# Patient Record
Sex: FEMALE | Race: WHITE | NOT HISPANIC OR LATINO | Employment: FULL TIME | URBAN - METROPOLITAN AREA
[De-identification: names, ages, dates, MRNs, and addresses within clinical notes are randomized per-mention and may not be internally consistent; named-entity substitution may affect disease eponyms.]

---

## 2023-11-09 ENCOUNTER — OFFICE VISIT (OUTPATIENT)
Dept: FAMILY MEDICINE CLINIC | Facility: CLINIC | Age: 48
End: 2023-11-09
Payer: COMMERCIAL

## 2023-11-09 VITALS
WEIGHT: 153.6 LBS | SYSTOLIC BLOOD PRESSURE: 120 MMHG | TEMPERATURE: 97.9 F | BODY MASS INDEX: 22.75 KG/M2 | DIASTOLIC BLOOD PRESSURE: 80 MMHG | HEART RATE: 88 BPM | HEIGHT: 69 IN | OXYGEN SATURATION: 99 %

## 2023-11-09 DIAGNOSIS — Z23 ENCOUNTER FOR IMMUNIZATION: ICD-10-CM

## 2023-11-09 DIAGNOSIS — R07.9 CHEST PAIN, UNSPECIFIED TYPE: ICD-10-CM

## 2023-11-09 DIAGNOSIS — Z00.00 ANNUAL PHYSICAL EXAM: Primary | ICD-10-CM

## 2023-11-09 DIAGNOSIS — N95.1 PERIMENOPAUSE: ICD-10-CM

## 2023-11-09 PROBLEM — K64.9 HEMORRHOIDS: Status: ACTIVE | Noted: 2020-09-16

## 2023-11-09 PROBLEM — F41.1 GAD (GENERALIZED ANXIETY DISORDER): Status: ACTIVE | Noted: 2021-11-23

## 2023-11-09 PROBLEM — D70.9 NEUTROPENIA (HCC): Status: ACTIVE | Noted: 2021-07-15

## 2023-11-09 PROBLEM — N83.209 HEMORRHAGIC OVARIAN CYST: Status: ACTIVE | Noted: 2021-11-05

## 2023-11-09 PROBLEM — R92.30 DENSE BREAST TISSUE ON MAMMOGRAM: Status: ACTIVE | Noted: 2021-08-30

## 2023-11-09 PROCEDURE — 90471 IMMUNIZATION ADMIN: CPT | Performed by: FAMILY MEDICINE

## 2023-11-09 PROCEDURE — 99386 PREV VISIT NEW AGE 40-64: CPT | Performed by: FAMILY MEDICINE

## 2023-11-09 PROCEDURE — 93000 ELECTROCARDIOGRAM COMPLETE: CPT | Performed by: FAMILY MEDICINE

## 2023-11-09 PROCEDURE — 90686 IIV4 VACC NO PRSV 0.5 ML IM: CPT | Performed by: FAMILY MEDICINE

## 2023-11-09 RX ORDER — OMEGA-3S/DHA/EPA/FISH OIL 300-1000MG
300 CAPSULE ORAL DAILY
COMMUNITY
End: 2023-11-09

## 2023-11-09 RX ORDER — FAMOTIDINE 20 MG
TABLET ORAL
COMMUNITY
End: 2023-11-09

## 2023-11-09 NOTE — PATIENT INSTRUCTIONS
Promotion of Mental Health  Dr. Deven Roper recommends the following for the promotion of mental health:    Counseling/therapy may be of help to you  [facility]   If you are taking medication, you must take it as prescribed. Do not stop taking it or change doses without speaking to your doctor. I encourage daily mindfulness habits including:  Go outside  Breathing exercises daily  Meditations or guided relaxation  Sleep hygiene (going to bed and waking up at the same time every day, even weekends)  Fueling your body and mind with water and nutritious food throughout the day  Let your friends and family know how you're feeling. Give them the opportunity to support you. Do not isolate yourself. Similarly, you may want to spend less time with people in your life who have bad habits you are trying to eliminate, or those who bring you down. Be mindful of habits like smoking, drinking alcohol, and the use of other substances. I recommend a "clean" lifestyle to improve your mental health. Use smart phone apps and YouFoodyube to find meditations and breathing exercises:  Free apps I like: Insight Timer, Woebot, Breethe, Breathe+, MyLife Meditation --> note some of these apps have free and paid sections, so you may not be able to access all features. Paid apps I like: Headspace, Breathing Zone, [place], Calm  I am not sponsored by nor do I receive money from these apps, and they are not officially recommended by Bonilla Villeda. I recommend them because I use them or my patients use them with success.    If you ever feel like you may hurt yourself or someone else, please call 9-1-1, text [phone], or go to the nearest emergency department    I also recommend signing up for My Chart if you haven't already, which is the [facility]. Adria's jenny, so that you can send me messages to ask questions through the My Chart portal.     [email or web address]    National Suicide Prevention Hotline: 3-653.423.2767  If you or someone you know is suicidal or in emotional distress, contact the [place]. Trained crisis workers are available to talk 24 hours a day, 7 days a week. Your confidential and toll-free call goes to the nearest crisis center in the "[employer] These centers provide crisis counseling and mental health referrals. If you or someone you know is in immediate danger, please call 9-1-1. [facility] Treatment Referral Helpline: 6-426.822.9697  Get general information on mental health and locate treatment services in your area. Speak to a live person, Monday through Friday from 8 a.m. to 8 p.m. EST. Support Groups:  [facility] Support Groups  Call intake to register: [address] on Mental Illness:  [address] [place] [address]  [phone]  http://www.OnForce/  They provide multiple services including support groups for those with mental illness, as well as the family members of individuals with mental illness      Wellness Visit for Adults   AMBULATORY CARE:   A wellness visit  is when you see your healthcare provider to get screened for health problems. Your healthcare provider will also give you advice on how to stay healthy. Write down your questions so you remember to ask them. Ask your healthcare provider how often you should have a wellness visit. What happens at a wellness visit:  Your healthcare provider will ask about your health, and your family history of health problems. This includes high blood pressure, heart disease, and cancer. He or she will ask if you have symptoms that concern you, if you smoke, and about your mood. You may also be asked about your intake of medicines, supplements, food, and alcohol. Any of the following may be done: Your weight  will be checked. Your height may also be checked so your body mass index (BMI) can be calculated.  Your BMI shows if you are at a healthy weight. Your blood pressure  and heart rate will be checked. Your temperature may also be checked. Blood and urine tests  may be done. Blood tests may be done to check your cholesterol levels. Abnormal cholesterol levels increase your risk for heart disease and stroke. You may also need a blood or urine test to check for diabetes if you are at increased risk. Urine tests may be done to look for signs of an infection or kidney disease. A physical exam  includes checking your heartbeat and lungs with a stethoscope. Your healthcare provider may also check your skin to look for sun damage. Screening tests  may be recommended. A screening test is done to check for diseases that may not cause symptoms. The screening tests you may need depend on your age, gender, family history, and lifestyle habits. For example, colorectal screening may be recommended if you are 48years old or older. Screening tests you need if you are a woman:   A Pap smear  is used to screen for cervical cancer. Pap smears are usually done every 3 to 5 years depending on your age. You may need them more often if you have had abnormal Pap smear test results in the past. Ask your healthcare provider how often you should have a Pap smear. A mammogram  is an x-ray of your breasts to screen for breast cancer. Experts recommend mammograms every 2 years starting at age 48 years. You may need a mammogram at age 52 years or younger if you have an increased risk for breast cancer. Talk to your healthcare provider about when you should start having mammograms and how often you need them. Vaccines you may need:   Get an influenza vaccine  every year. The influenza vaccine protects you from the flu. Several types of viruses cause the flu. The viruses change over time, so new vaccines are made each year. Get a tetanus-diphtheria (Td) booster vaccine  every 10 years.  This vaccine protects you against tetanus and diphtheria. Tetanus is a severe infection that may cause painful muscle spasms and lockjaw. Diphtheria is a severe bacterial infection that causes a thick covering in the back of your mouth and throat. Get a human papillomavirus (HPV) vaccine  if you are female and aged 23 to 32 or male 23 to 24 and never received it. This vaccine protects you from HPV infection. HPV is the most common infection spread by sexual contact. HPV may also cause vaginal, penile, and anal cancers. Get a pneumococcal vaccine  if you are aged 72 years or older. The pneumococcal vaccine is an injection given to protect you from pneumococcal disease. Pneumococcal disease is an infection caused by pneumococcal bacteria. The infection may cause pneumonia, meningitis, or an ear infection. Get a shingles vaccine  if you are 60 or older, even if you have had shingles before. The shingles vaccine is an injection to protect you from the varicella-zoster virus. This is the same virus that causes chickenpox. Shingles is a painful rash that develops in people who had chickenpox or have been exposed to the virus. How to eat healthy:  My Plate is a model for planning healthy meals. It shows the types and amounts of foods that should go on your plate. Fruits and vegetables make up about half of your plate, and grains and protein make up the other half. A serving of dairy is included on the side of your plate. The amount of calories and serving sizes you need depends on your age, gender, weight, and height. Examples of healthy foods are listed below:  Eat a variety of vegetables  such as dark green, red, and orange vegetables. You can also include canned vegetables low in sodium (salt) and frozen vegetables without added butter or sauces. Eat a variety of fresh fruits , canned fruit in 100% juice, frozen fruit, and dried fruit. Include whole grains. At least half of the grains you eat should be whole grains.  Examples include whole-wheat bread, wheat pasta, brown rice, and whole-grain cereals such as oatmeal.    Eat a variety of protein foods such as seafood (fish and shellfish), lean meat, and poultry without skin (turkey and chicken). Examples of lean meats include pork leg, shoulder, or tenderloin, and beef round, sirloin, tenderloin, and extra lean ground beef. Other protein foods include eggs and egg substitutes, beans, peas, soy products, nuts, and seeds. Choose low-fat dairy products such as skim or 1% milk or low-fat yogurt, cheese, and cottage cheese. Limit unhealthy fats  such as butter, hard margarine, and shortening. Exercise:  Exercise at least 30 minutes per day on most days of the week. Some examples of exercise include walking, biking, dancing, and swimming. You can also fit in more physical activity by taking the stairs instead of the elevator or parking farther away from stores. Include muscle strengthening activities 2 days each week. Regular exercise provides many health benefits. It helps you manage your weight, and decreases your risk for type 2 diabetes, heart disease, stroke, and high blood pressure. Exercise can also help improve your mood. Ask your healthcare provider about the best exercise plan for you. General health and safety guidelines:   Do not smoke. Nicotine and other chemicals in cigarettes and cigars can cause lung damage. Ask your healthcare provider for information if you currently smoke and need help to quit. E-cigarettes or smokeless tobacco still contain nicotine. Talk to your healthcare provider before you use these products. Limit alcohol. A drink of alcohol is 12 ounces of beer, 5 ounces of wine, or 1½ ounces of liquor. Lose weight, if needed. Being overweight increases your risk of certain health conditions. These include heart disease, high blood pressure, type 2 diabetes, and certain types of cancer. Protect your skin. Do not sunbathe or use tanning beds.  Use sunscreen with a SPF 13 or higher. Apply sunscreen at least 15 minutes before you go outside. Reapply sunscreen every 2 hours. Wear protective clothing, hats, and sunglasses when you are outside. Drive safely. Always wear your seatbelt. Make sure everyone in your car wears a seatbelt. A seatbelt can save your life if you are in an accident. Do not use your cell phone when you are driving. This could distract you and cause an accident. Pull over if you need to make a call or send a text message. Practice safe sex. Use latex condoms if are sexually active and have more than one partner. Your healthcare provider may recommend screening tests for sexually transmitted infections (STIs). Wear helmets, lifejackets, and protective gear. Always wear a helmet when you ride a bike or motorcycle, go skiing, or play sports that could cause a head injury. Wear protective equipment when you play sports. Wear a lifejacket when you are on a boat or doing water sports. © Copyright Christine Heather 2023 Information is for End User's use only and may not be sold, redistributed or otherwise used for commercial purposes. The above information is an  only. It is not intended as medical advice for individual conditions or treatments. Talk to your doctor, nurse or pharmacist before following any medical regimen to see if it is safe and effective for you.

## 2023-11-10 ENCOUNTER — TELEPHONE (OUTPATIENT)
Dept: ADMINISTRATIVE | Facility: OTHER | Age: 48
End: 2023-11-10

## 2023-11-10 NOTE — TELEPHONE ENCOUNTER
Upon review of the In Basket request we were able to locate, review, and update the patient chart as requested for Pap Smear (HPV) aka Cervical Cancer Screening. Any additional questions or concerns should be emailed to the Practice Liaisons via the appropriate education email address, please do not reply via In Basket.     Thank you  Violeta Branch

## 2023-11-10 NOTE — TELEPHONE ENCOUNTER
----- Message from Brook Forbes MD sent at 11/9/2023 12:31 PM EST -----  Regarding: Health Maintenance  11/09/23 12:31 PM    Az, our patient Gretchen Orr has had Pap Smear (HPV) aka Cervical Cancer Screening completed/performed. Please assist in updating the patient chart by pulling the Care Everywhere (CE) document. The date of service is 2019.      Thank you,  Brook Forbes MD  [facility]

## 2023-11-13 ENCOUNTER — OFFICE VISIT (OUTPATIENT)
Dept: OBGYN CLINIC | Facility: CLINIC | Age: 48
End: 2023-11-13

## 2023-11-13 VITALS
HEART RATE: 109 BPM | WEIGHT: 154.6 LBS | HEIGHT: 69 IN | SYSTOLIC BLOOD PRESSURE: 130 MMHG | DIASTOLIC BLOOD PRESSURE: 70 MMHG | BODY MASS INDEX: 22.9 KG/M2

## 2023-11-13 DIAGNOSIS — Z12.4 SCREENING FOR CERVICAL CANCER: ICD-10-CM

## 2023-11-13 DIAGNOSIS — R22.32 AXILLARY MASS, LEFT: ICD-10-CM

## 2023-11-13 DIAGNOSIS — Z01.419 ENCOUNTER FOR ANNUAL ROUTINE GYNECOLOGICAL EXAMINATION: Primary | ICD-10-CM

## 2023-11-13 PROBLEM — N83.209 HEMORRHAGIC OVARIAN CYST: Status: RESOLVED | Noted: 2021-11-05 | Resolved: 2023-11-13

## 2023-11-13 PROCEDURE — G0476 HPV COMBO ASSAY CA SCREEN: HCPCS | Performed by: OBSTETRICS & GYNECOLOGY

## 2023-11-13 PROCEDURE — G0145 SCR C/V CYTO,THINLAYER,RESCR: HCPCS | Performed by: OBSTETRICS & GYNECOLOGY

## 2023-11-13 RX ORDER — VITAMIN B COMPLEX
TABLET ORAL
COMMUNITY
Start: 2021-07-10

## 2023-11-13 NOTE — PROGRESS NOTES
Negrito Martinez is a 57 y.o. female who presents for annual exam.      Chief Complaint   Patient presents with    New Patient Visit     Yearly and read u/s breast report completed and "herscan"     New patient - Previously at [facility]  She had a HerScan breast US completed recently - noted "left axillary lymph node with thickened cortex". Had been recommended for ABUS for supplemental screening in the past due to dense breast tissue but due to cost she pursued HerScan as it would be less out of pocket    Intermittent bilateral breast pain/discomfort in past several months   Has not noticed any lump. She will intermittently get some pain in pec muscles with exertion (gardening etc)   Has not noticed any breast/axillary mass     Last over 100 lbs in last few years   still regular menstrual cycles - overall lighter than previously, lasting 4-5 days       Last Pap: 2023 pap NILM/HPV neg   Last mammogram: 2023 BIRADS1, extremely dense  Colorectal cancer screening: Not on file no prior   DEXA: n/a    Current contraception: condoms  History of abnormal Pap smear: no  History of abnormal mammogram: yes - left breast biopsy  - benign       Family history of uterine or ovarian cancer: no  Family history of breast cancer: yes - MGM age 54  Family history of colon cancer: no      Menstrual History:  OB History          1    Para   1    Term   1            AB        Living   1         SAB        IAB        Ectopic        Multiple        Live Births   1                    Patient's last menstrual period was 10/31/2023 (approximate). No past medical history on file.   Past Surgical History:   Procedure Laterality Date     SECTION      MAMMO STEREOTACTIC BREAST BIOPSY LEFT (ALL INC)       Family History   Problem Relation Age of Onset    Melanoma Mother     Hyperlipidemia Father     Heart attack Father     Breast cancer Maternal Grandmother 46       Social History Tobacco Use    Smoking status: Never     Passive exposure: Past    Smokeless tobacco: Never   Vaping Use    Vaping Use: Never used   Substance Use Topics    Alcohol use: Yes    Drug use: Never          Current Outpatient Medications:     cholecalciferol (VITAMIN D3) 25 mcg (1,000 units) tablet, , Disp: , Rfl:     Calcium Carbonate 1500 (600 Ca) MG TABS, , Disp: , Rfl:     Cholecalciferol 25 MCG (1000 UT) CHEW, Chew, Disp: , Rfl:     Allergies   Allergen Reactions    Azithromycin Hives     Other reaction(s): hives           Review of Systems   Constitutional:  Negative for appetite change, chills and fever. Eyes:  Negative for visual disturbance. Respiratory:  Negative for cough, chest tightness and shortness of breath. Cardiovascular:  Negative for chest pain. Gastrointestinal:  Negative for abdominal distention, abdominal pain, constipation, diarrhea, nausea and vomiting. Endocrine: Negative for cold intolerance and heat intolerance. Genitourinary:  Negative for difficulty urinating, dyspareunia, dysuria, frequency, genital sores, pelvic pain, urgency, vaginal bleeding, vaginal discharge and vaginal pain. Musculoskeletal:  Negative for arthralgias. Neurological:  Negative for light-headedness and headaches. Hematological:  Does not bruise/bleed easily. Psychiatric/Behavioral:  Negative for behavioral problems. The patient is nervous/anxious. All other systems reviewed and are negative. /70 (BP Location: Right arm, Patient Position: Sitting, Cuff Size: Adult)   Pulse (!) 109   Ht 5' 9" (1.753 m)   Wt 70.1 kg (154 lb 9.6 oz)   LMP 10/31/2023 (Approximate)   BMI 22.83 kg/m²         Physical Exam  Constitutional:       General: She is not in acute distress. Appearance: Normal appearance. Genitourinary:      Vulva, bladder and urethral meatus normal.      No lesions in the vagina. Right Labia: No rash, tenderness, lesions or skin changes.      Left Labia: No tenderness, lesions, skin changes or rash. No labial fusion noted. No inguinal adenopathy present in the right or left side. No vaginal discharge, erythema, tenderness, bleeding or ulceration. No vaginal prolapse present. Right Adnexa: not tender, not full and no mass present. Left Adnexa: not tender, not full and no mass present. No cervical motion tenderness, discharge, friability, lesion or polyp. Uterus is not enlarged, fixed, tender or irregular. No uterine mass detected. Uterus is anteverted. Pelvic exam was performed with patient in the lithotomy position. Breasts:     Right: No swelling, bleeding, inverted nipple, mass, nipple discharge, skin change or tenderness. Left: No swelling, bleeding, inverted nipple, mass, nipple discharge, skin change or tenderness. HENT:      Head: Normocephalic and atraumatic. Neck:      Thyroid: No thyromegaly. Cardiovascular:      Rate and Rhythm: Normal rate and regular rhythm. Pulmonary:      Effort: Pulmonary effort is normal. No accessory muscle usage or respiratory distress. Abdominal:      General: There is no distension. Palpations: Abdomen is soft. Tenderness: There is no abdominal tenderness. There is no guarding or rebound. Musculoskeletal:         General: Normal range of motion. Cervical back: Normal range of motion and neck supple. Lymphadenopathy:      Upper Body:      Right upper body: No supraclavicular or axillary adenopathy. Left upper body: No supraclavicular or axillary adenopathy. Lower Body: No right inguinal and no right inguinal adenopathy. No left inguinal and no left inguinal adenopathy. Neurological:      General: No focal deficit present. Mental Status: She is alert. Skin:     General: Skin is warm and dry. Findings: No erythema.    Psychiatric:         Mood and Affect: Mood normal.         Behavior: Behavior normal.   Vitals and nursing note reviewed. Exam conducted with a chaperone present. No results found for this or any previous visit. Axillary mass, left  No mass palpable on exam. Given US findings a diagnostic mammogram/US was ordered     Encounter for annual routine gynecological examination  - Discussed ACOG guidelines for pap smear screening frequency: performed today  - Discussed healthy lifestyle recommendations for diet, exercise and self breast awareness.  - Discussed ACOG recommendations for screening mammograms: as above   - Discussed age based recommendations for adequate calcium and vitamin D intake. No additional osteoporosis screening indicated at this time. - Discussed ACOG recommendations for colon cancer screening: reviewed options, she declines at this time   - Safe sex practices were discussed and STI testing was not desired by the patient  - Contraceptive options were reviewed: happy with condoms   - Routine follow up in 1 year was recommended or sooner as needed. All questions and concerns were addressed.

## 2023-11-13 NOTE — ASSESSMENT & PLAN NOTE
- Discussed ACOG guidelines for pap smear screening frequency: performed today  - Discussed healthy lifestyle recommendations for diet, exercise and self breast awareness.  - Discussed ACOG recommendations for screening mammograms: as above   - Discussed age based recommendations for adequate calcium and vitamin D intake. No additional osteoporosis screening indicated at this time. - Discussed ACOG recommendations for colon cancer screening: reviewed options, she declines at this time   - Safe sex practices were discussed and STI testing was not desired by the patient  - Contraceptive options were reviewed: happy with condoms   - Routine follow up in 1 year was recommended or sooner as needed. All questions and concerns were addressed.

## 2023-11-15 LAB
HPV HR 12 DNA CVX QL NAA+PROBE: NEGATIVE
HPV16 DNA CVX QL NAA+PROBE: NEGATIVE
HPV18 DNA CVX QL NAA+PROBE: NEGATIVE

## 2023-11-21 LAB
LAB AP GYN PRIMARY INTERPRETATION: NORMAL
Lab: NORMAL

## 2023-12-14 ENCOUNTER — APPOINTMENT (OUTPATIENT)
Dept: LAB | Facility: CLINIC | Age: 48
End: 2023-12-14
Payer: COMMERCIAL

## 2023-12-14 DIAGNOSIS — R07.9 CHEST PAIN, UNSPECIFIED TYPE: ICD-10-CM

## 2023-12-14 DIAGNOSIS — N95.1 PERIMENOPAUSE: ICD-10-CM

## 2023-12-14 LAB
25(OH)D3 SERPL-MCNC: 40.5 NG/ML (ref 30–100)
ALBUMIN SERPL BCP-MCNC: 4.3 G/DL (ref 3.5–5)
ALP SERPL-CCNC: 45 U/L (ref 34–104)
ALT SERPL W P-5'-P-CCNC: 13 U/L (ref 7–52)
ANION GAP SERPL CALCULATED.3IONS-SCNC: 9 MMOL/L
AST SERPL W P-5'-P-CCNC: 20 U/L (ref 13–39)
BILIRUB SERPL-MCNC: 0.64 MG/DL (ref 0.2–1)
BUN SERPL-MCNC: 13 MG/DL (ref 5–25)
CALCIUM SERPL-MCNC: 9.3 MG/DL (ref 8.4–10.2)
CHLORIDE SERPL-SCNC: 104 MMOL/L (ref 96–108)
CHOLEST SERPL-MCNC: 171 MG/DL
CK SERPL-CCNC: 60 U/L (ref 26–192)
CO2 SERPL-SCNC: 27 MMOL/L (ref 21–32)
CREAT SERPL-MCNC: 0.67 MG/DL (ref 0.6–1.3)
ERYTHROCYTE [DISTWIDTH] IN BLOOD BY AUTOMATED COUNT: 11.9 % (ref 11.6–15.1)
GFR SERPL CREATININE-BSD FRML MDRD: 105 ML/MIN/1.73SQ M
GLUCOSE P FAST SERPL-MCNC: 86 MG/DL (ref 65–99)
HCT VFR BLD AUTO: 41.6 % (ref 34.8–46.1)
HDLC SERPL-MCNC: 49 MG/DL
HGB BLD-MCNC: 13.4 G/DL (ref 11.5–15.4)
LDLC SERPL CALC-MCNC: 111 MG/DL (ref 0–100)
MCH RBC QN AUTO: 29.7 PG (ref 26.8–34.3)
MCHC RBC AUTO-ENTMCNC: 32.2 G/DL (ref 31.4–37.4)
MCV RBC AUTO: 92 FL (ref 82–98)
PLATELET # BLD AUTO: 201 THOUSANDS/UL (ref 149–390)
PMV BLD AUTO: 10.3 FL (ref 8.9–12.7)
POTASSIUM SERPL-SCNC: 3.8 MMOL/L (ref 3.5–5.3)
PROT SERPL-MCNC: 7.4 G/DL (ref 6.4–8.4)
RBC # BLD AUTO: 4.51 MILLION/UL (ref 3.81–5.12)
SODIUM SERPL-SCNC: 140 MMOL/L (ref 135–147)
TRIGL SERPL-MCNC: 55 MG/DL
TSH SERPL DL<=0.05 MIU/L-ACNC: 1.35 UIU/ML (ref 0.45–4.5)
WBC # BLD AUTO: 2.94 THOUSAND/UL (ref 4.31–10.16)

## 2023-12-14 PROCEDURE — 85027 COMPLETE CBC AUTOMATED: CPT

## 2023-12-14 PROCEDURE — 80061 LIPID PANEL: CPT

## 2023-12-14 PROCEDURE — 84443 ASSAY THYROID STIM HORMONE: CPT

## 2023-12-14 PROCEDURE — 82306 VITAMIN D 25 HYDROXY: CPT

## 2023-12-14 PROCEDURE — 82550 ASSAY OF CK (CPK): CPT

## 2023-12-14 PROCEDURE — 80053 COMPREHEN METABOLIC PANEL: CPT

## 2023-12-14 PROCEDURE — 36415 COLL VENOUS BLD VENIPUNCTURE: CPT

## 2024-01-12 ENCOUNTER — OFFICE VISIT (OUTPATIENT)
Age: 49
End: 2024-01-12
Payer: COMMERCIAL

## 2024-01-12 ENCOUNTER — TELEPHONE (OUTPATIENT)
Dept: OBGYN CLINIC | Facility: CLINIC | Age: 49
End: 2024-01-12

## 2024-01-12 ENCOUNTER — PATIENT MESSAGE (OUTPATIENT)
Dept: OBGYN CLINIC | Facility: CLINIC | Age: 49
End: 2024-01-12

## 2024-01-12 VITALS
DIASTOLIC BLOOD PRESSURE: 72 MMHG | HEIGHT: 69 IN | OXYGEN SATURATION: 99 % | BODY MASS INDEX: 22.96 KG/M2 | HEART RATE: 100 BPM | SYSTOLIC BLOOD PRESSURE: 118 MMHG | WEIGHT: 155 LBS

## 2024-01-12 DIAGNOSIS — N63.10 MASS OF RIGHT BREAST, UNSPECIFIED QUADRANT: Primary | ICD-10-CM

## 2024-01-12 DIAGNOSIS — N64.4 PAIN OF BOTH BREASTS: Primary | ICD-10-CM

## 2024-01-12 PROBLEM — Z01.419 ENCOUNTER FOR ANNUAL ROUTINE GYNECOLOGICAL EXAMINATION: Status: RESOLVED | Noted: 2023-11-13 | Resolved: 2024-01-12

## 2024-01-12 PROCEDURE — 99213 OFFICE O/P EST LOW 20 MIN: CPT | Performed by: PHYSICIAN ASSISTANT

## 2024-01-12 NOTE — PROGRESS NOTES
Patient c/o pain in right  breast X 1 month.  There is + pain, no breast discharge, no discoloration.  There was no breast injury.  Patient has breast biopsy left breast 2 years ago.    Painful lump right breast 10/2023  Right armpit swollen and tender end of December.  Went away after 5 days.    ABUS @ Her[facility] done 10/2023;  left axillary lymph node with thickened cortex  (patient was able to pull this up on her phone)    Family history of breast cancer: MGM @ 50's    Menses monthly and regular, duration 5-6 days    Last mammogram: 3/6/23     Current Outpatient Medications on File Prior to Visit   Medication Sig Dispense Refill    Calcium Carbonate 1500 (600 Ca) MG TABS  (Patient not taking: Reported on 11/9/2023)      cholecalciferol (VITAMIN D3) 25 mcg (1,000 units) tablet       Cholecalciferol 25 MCG (1000 UT) CHEW Chew       No current facility-administered medications on file prior to visit.       Allergies   Allergen Reactions    Azithromycin Hives     Other reaction(s): hives           Vitals:    01/12/24 1315   BP: 118/72   Pulse: 100   SpO2: 99%         breasts examined sitting and lying  Left breast- thickened area at 12 oclock 3 cm from nipple. no discharge, no discoloration, no tenderness, no retraction, no dimpling  Right breast- thickened area 12 oclock 3 cm from nipple, no discharge, no discoloration, no tenderness, no retraction, no dimpling    Area of thickening feels similar in both breasts    Nodes- no cervical, subclavicular, or axillary node enlargment      Assessment:  Thickening both breast    Plan:  bilateral diagnostic mammogram order has already been placed  US right  breast/  US left breast- order already placed  Await results of imaging  f/u pending results   Oriented - self; Oriented - place; Oriented - time

## 2024-01-12 NOTE — PATIENT COMMUNICATION
Patient was requesting for the right side as well due to new s/s of tightness on the outside of the right breast as well as pain that is on and off. She also states of a sensation, tenderness and becomes swollen around her armpit as well.  Patient did have an outside scan/u/s of breast and they saw something with the lymph node is why she is completing the diagnostic in the first place but would like to do both at this time due to her new onset symptoms appearing on the right breast. Please advise

## 2024-01-12 NOTE — TELEPHONE ENCOUNTER
Regarding: Breast scan  Contact: [phone]  ----- Message from Kacie Garcia LPN sent at 1/12/2024  8:57 AM EST -----       ----- Message from Noemy Bassett to Ladan Bullock MD sent at 1/12/2024  8:43 AM -----   Hi-    I have tried twice to call to schedule appointment - waiting for a follow up call- will call again this morning. In the mean time, I am having sensations on my right sight- both armpit and breast. The scan and mammo are for the left but I am wondering if you can write the script for both sides?  Thank you-  Ximena

## 2024-01-15 ENCOUNTER — TELEPHONE (OUTPATIENT)
Age: 49
End: 2024-01-15

## 2024-01-15 NOTE — TELEPHONE ENCOUNTER
Regarding: breast cancer risk calculator  Contact: [phone]  ----- Message from Gayle Ham sent at 1/15/2024  2:41 PM EST -----       ----- Message from Noemy Bassett to Humberto Stafford PA-C sent at 1/15/2024  2:39 PM -----   My mom is 73 (a lump was taken out at 46 that was fatty tissue)    I have no sisters    My only aunt is 69- no known breast issues (not in contact)    My maternal grandmother  - 78  My paternal grandmother  -     Let me know if you need anything else.      Also- - called for appointment for us started to be put in system again- the  heard my story and put me thru to the [facility]. I left a message Friday and am waiting to hear back. I will call tomorrow if I don't hear back.    Thank you for your kind bedside manner last week.    Have a nice day!      ----- Message -----       From:Humberto Stafford       Sent:1/15/2024  2:15 PM EST         To:Noemy Bassett    Subject:breast cancer risk calculator    Noemy,    I would like to try to complete the breast cancer risk calculator.  I have the info we discussed on Friday.  I need some more info to complete the risk calculation.    Need current age or age :  Sisters  Mother  Grandmothers- both mom and dad's mothers  Aunts both mom/dad side of family    I am hoping I can get it to work this time.   I will let you know.    Humberto Stafford

## 2024-01-31 ENCOUNTER — HOSPITAL ENCOUNTER (OUTPATIENT)
Dept: ULTRASOUND IMAGING | Facility: CLINIC | Age: 49
Discharge: HOME | End: 2024-01-31
Payer: COMMERCIAL

## 2024-01-31 ENCOUNTER — HOSPITAL ENCOUNTER (OUTPATIENT)
Dept: MAMMOGRAPHY | Facility: CLINIC | Age: 49
Discharge: HOME | End: 2024-01-31
Payer: COMMERCIAL

## 2024-01-31 VITALS — WEIGHT: 155 LBS | HEIGHT: 69 IN | BODY MASS INDEX: 22.96 KG/M2

## 2024-01-31 DIAGNOSIS — N63.10 MASS OF RIGHT BREAST, UNSPECIFIED QUADRANT: ICD-10-CM

## 2024-01-31 DIAGNOSIS — N64.4 PAIN OF BOTH BREASTS: ICD-10-CM

## 2024-01-31 PROCEDURE — 76642 ULTRASOUND BREAST LIMITED: CPT

## 2024-01-31 PROCEDURE — 77066 DX MAMMO INCL CAD BI: CPT

## 2024-01-31 PROCEDURE — G0279 TOMOSYNTHESIS, MAMMO: HCPCS

## 2024-02-01 ENCOUNTER — TELEPHONE (OUTPATIENT)
Dept: OBGYN CLINIC | Facility: CLINIC | Age: 49
End: 2024-02-01

## 2024-02-01 NOTE — TELEPHONE ENCOUNTER
Reviewed mammogram results.  No abnormality found.  Discussed that with her dense breasts and high tyrer cuzik score that we can consider additional screening with either a whole breast US or breast MRI.  Also reviewed  option to be followed by and breast surgeon due her hx, off and on pain.  Patient would like to think about it and will let me know if she wants additional screening or to see the breast surgeon.     She says she felt sore area with concern for lymph node in her back.  She is concerned she has something going on with her lymph glands.  I recommended that she see her PCP for eval.

## 2024-03-13 ENCOUNTER — TELEMEDICINE (OUTPATIENT)
Dept: OBGYN CLINIC | Facility: CLINIC | Age: 49
End: 2024-03-13
Payer: COMMERCIAL

## 2024-03-13 DIAGNOSIS — T78.1XXA GASTROINTESTINAL FOOD SENSITIVITY: ICD-10-CM

## 2024-03-13 DIAGNOSIS — F41.9 ANXIETY: Primary | ICD-10-CM

## 2024-03-13 DIAGNOSIS — N95.1 PERIMENOPAUSE: ICD-10-CM

## 2024-03-13 PROCEDURE — 99215 OFFICE O/P EST HI 40 MIN: CPT | Performed by: OBSTETRICS & GYNECOLOGY

## 2024-03-16 NOTE — PROGRESS NOTES
Virtual Regular Visit    Verification of patient location:    Patient is located at Home in the following state in which I hold an active license [place]      Assessment/Plan:    Problem List Items Addressed This Visit    None  Visit Diagnoses       Anxiety    -  Primary    Perimenopause        Gastrointestinal food sensitivity              1)  Discussed the wide and varied hormonal fluctuations associated with the perimenopausal time frame, often resulting in estrogen dominance and relative progesterone deficiency. This results in menorrhagia, uterine cellular growth with fibroids, endometriosis, breast density along with progesterone loss symptoms including sleep and anxiety or mood. Her periods are actually still very normal and manageable.  2) She does have IMHO severe anxiety, that is heightened in luteal phase of menstrual cycle. After discussing the biology of hormonal fluctuations, offered treatment options for this including: a) OCP ( declined); b) estradiol metabolizer supplements; c) luteal or continuous progesterone. She prefers the supplementation route, offered PMS Support through [employer] to try.   3) Discussed leaky gut in detail. Offered food antibody testing through [employer] vs actual allergy consult for elaborate testing. She would like to look at David test, ordered  4) Adrenal steroid pathway discussed, cortisol testing as well. She would like this tested too, just the cortisol part, I also ordered 24 hr salivary cortisol testing through [employer] as well.  5) We did not schedule a follow up, unless testing comes back abnormal. She will get testing results from [employer] directly, but I am able to help guide treatment options based on this if needed. I think this will give her peace of mind with regard to food choice. Follow up prn.    This was a 60 minute visit with greater than 50% of time spent in face to face counseling and coordination of care.       Chief Complaint   Patient presents with     Virtual Regular Visit          Encounter provider Esperanza Lombardo MD    Provider located at OB/GYN [facility] [address] [address] [phone]      Recent Visits  Date Type Provider Dept   03/13/24 Telemedicine Esperanza Lombardo MD [facility]   Showing recent visits within past 7 days and meeting all other requirements  Future Appointments  No visits were found meeting these conditions.  Showing future appointments within next 150 days and meeting all other requirements       The patient was identified by name and date of birth. Noemy Bassett was informed that this is a telemedicine visit and that the visit is being conducted through the Epic Embedded platform. She agrees to proceed..  My office door was closed. No one else was in the room.  She acknowledged consent and understanding of privacy and security of the video platform. The patient has agreed to participate and understands they can discontinue the visit at any time.    Patient is aware this is a billable service.     Subjective      Ximena presents for hormonal/functional medicine consult, her first visit with me; self referred, sees Dr. Garcia.   Ximena is perimenopausal, with periods virtually every 28 day, starting to come a few days earlier. NFP for contraception. Her concerns;  1) Gut issues: lost 110 lbs in 2017 by cutting out sugars, doing the Whole 30 Paleo food plan, Admits to being very stressed with Covid pandemic, so started having issues with many foods with bloating and pain. Can not tolerate hemp hearts, rice, cashew and is starting to become very nervous about food choices.   2) Anxiety: admits to always being high strung, but her coping skills are more difficult. 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 Inquires about salivary cortisol testing  3) Joint pain, shoulders and hips. Notes this was  worse with foods like corn chips and salsa, relieved with elimination of these foods.   PMXH: s/p breast bx, benign.   SHX: denies tobacco, ETOH  FHX: Mom: melanoma, but stable. Essential tremors ( which Ximena herself has sometimes and has her worried); MGM breast Ca in her 60s. Dad DM2, Afib, AMI, Guillan Henderson, alive. 2 brothers, one with hyperlipid. 1 son, 11, healthy       No past medical history on file.    Past Surgical History:   Procedure Laterality Date     SECTION      MAMMO STEREOTACTIC BREAST BIOPSY LEFT (ALL INC)      US GUIDED BREAST BIOPSY LEFT COMPLETE Left 3/4/2022       Current Outpatient Medications   Medication Sig Dispense Refill    cholecalciferol (VITAMIN D3) 25 mcg (1,000 units) tablet       Cholecalciferol 25 MCG (1000 UT) CHEW Chew       No current facility-administered medications for this visit.        Allergies   Allergen Reactions    Azithromycin Hives     Other reaction(s): hives       Review of Systems   Constitutional: Negative.    Gastrointestinal:  Positive for abdominal distention and abdominal pain. Negative for blood in stool, nausea and vomiting.   Endocrine: Negative.    Genitourinary: Negative.    Musculoskeletal:  Positive for arthralgias.   Skin: Negative.  Negative for rash.   Neurological:  Positive for tremors.   Psychiatric/Behavioral:  Positive for agitation. The patient is nervous/anxious.      Video Exam    There were no vitals filed for this visit.    Physical Exam

## 2024-03-22 ENCOUNTER — NURSE TRIAGE (OUTPATIENT)
Age: 49
End: 2024-03-22

## 2024-03-22 NOTE — TELEPHONE ENCOUNTER
"Pt calling with concerns due to having left Tampon in for a few days after forgetting to take it out/being unsure if it was left in. Tampon came out while pt was going to the bathroom. States has been feeling tired, however denies fever, heart palpitations, nausea, vomiting, rashes, confusion. RN provided precautions. Pt verbalized an understanding. No further questions.   Reason for Disposition  • Information only question and nurse able to answer    Answer Assessment - Initial Assessment Questions  1. REASON FOR CALL or QUESTION: \"What is your reason for calling today?\" or \"How can I best help you?\" or \"What question do you have that I can help answer?\"      Toxic Shock Syndrome questions/advice    Protocols used: Information Only Call - No Triage-ADULT-[place]    "

## 2024-07-17 ENCOUNTER — OFFICE VISIT (OUTPATIENT)
Dept: FAMILY MEDICINE CLINIC | Facility: CLINIC | Age: 49
End: 2024-07-17
Payer: COMMERCIAL

## 2024-07-17 VITALS
HEIGHT: 69 IN | DIASTOLIC BLOOD PRESSURE: 72 MMHG | BODY MASS INDEX: 23.25 KG/M2 | TEMPERATURE: 98 F | SYSTOLIC BLOOD PRESSURE: 116 MMHG | HEART RATE: 89 BPM | WEIGHT: 157 LBS | OXYGEN SATURATION: 98 %

## 2024-07-17 DIAGNOSIS — B37.0 THRUSH: Primary | ICD-10-CM

## 2024-07-17 PROCEDURE — 99213 OFFICE O/P EST LOW 20 MIN: CPT | Performed by: FAMILY MEDICINE

## 2024-07-17 NOTE — PROGRESS NOTES
"Ambulatory Visit  Name: Noemy Bassett      : 1975      MRN: [id number]  Encounter Provider: Harsha Sanchez MD  Encounter Date: 2024   Encounter department: [facility]    Assessment & Plan   1. Thrush  -     nystatin (MYCOSTATIN) 500,000 units/5 mL suspension; Apply 5 mL (500,000 Units total) to the mouth or throat 4 (four) times a day   Counseled the patient regarding supportive care.  They are to call or return to the office if not improving.    Patient states she is moving out of state next week to [place] and thus will no longer be in our care.     History of Present Illness     HPI Patient notes a change in the appearance of her tongue in the last 3-4 weeks. She had a scratchy throat as well, so she wasn't sure if she had increased reflux. Tongue feels on and off sore. Eating chips seemed to irritate it, and she also cut back on seltzer. She had dry mouth but has been drinking more and chewing gum which helped.     Review of Systems   Constitutional:  Negative for fever.   HENT:  Positive for sore throat. Negative for congestion, ear pain, mouth sores, sinus pressure, trouble swallowing and voice change.    Respiratory: Negative.     Cardiovascular: Negative.    All other systems reviewed and are negative.    Medical History Reviewed by provider this encounter:       Objective     /72   Pulse 89   Temp 98 °F (36.7 °C)   Ht 5' 9\" (1.753 m)   Wt 71.2 kg (157 lb)   LMP 2024 (Exact Date)   SpO2 98%   BMI 23.18 kg/m²     Physical Exam  Vitals and nursing note reviewed.   Constitutional:       General: She is not in acute distress.     Appearance: She is well-developed.   HENT:      Head: Normocephalic and atraumatic.      Right Ear: External ear normal.      Left Ear: External ear normal.      Nose: Nose normal.      Mouth/Throat:      Tongue: No lesions.      Palate: No lesions.      Pharynx: Oropharynx is clear. No oropharyngeal exudate or " posterior oropharyngeal erythema.      Comments: Slight discharge noted in the mouth/tongue   Eyes:      Conjunctiva/sclera: Conjunctivae normal.   Neck:      Trachea: No tracheal deviation.   Pulmonary:      Effort: Pulmonary effort is normal.   Abdominal:      Tenderness: There is no abdominal tenderness.   Skin:     General: Skin is warm and dry.      Capillary Refill: Capillary refill takes less than 2 seconds.      Findings: No rash.   Neurological:      Mental Status: She is alert.      Cranial Nerves: No cranial nerve deficit.       Administrative Statements